# Patient Record
Sex: FEMALE | Race: WHITE | NOT HISPANIC OR LATINO
[De-identification: names, ages, dates, MRNs, and addresses within clinical notes are randomized per-mention and may not be internally consistent; named-entity substitution may affect disease eponyms.]

---

## 2021-08-10 PROBLEM — Z00.00 ENCOUNTER FOR PREVENTIVE HEALTH EXAMINATION: Status: ACTIVE | Noted: 2021-08-10

## 2021-08-13 ENCOUNTER — APPOINTMENT (OUTPATIENT)
Dept: ORTHOPEDIC SURGERY | Facility: CLINIC | Age: 51
End: 2021-08-13
Payer: COMMERCIAL

## 2021-08-13 ENCOUNTER — NON-APPOINTMENT (OUTPATIENT)
Age: 51
End: 2021-08-13

## 2021-08-13 VITALS
DIASTOLIC BLOOD PRESSURE: 85 MMHG | SYSTOLIC BLOOD PRESSURE: 125 MMHG | WEIGHT: 270 LBS | HEART RATE: 109 BPM | HEIGHT: 67.5 IN | BODY MASS INDEX: 41.88 KG/M2

## 2021-08-13 DIAGNOSIS — Z86.59 PERSONAL HISTORY OF OTHER MENTAL AND BEHAVIORAL DISORDERS: ICD-10-CM

## 2021-08-13 DIAGNOSIS — M18.12 UNILATERAL PRIMARY OSTEOARTHRITIS OF FIRST CARPOMETACARPAL JOINT, LEFT HAND: ICD-10-CM

## 2021-08-13 DIAGNOSIS — F17.200 NICOTINE DEPENDENCE, UNSPECIFIED, UNCOMPLICATED: ICD-10-CM

## 2021-08-13 DIAGNOSIS — Z78.0 ASYMPTOMATIC MENOPAUSAL STATE: ICD-10-CM

## 2021-08-13 PROCEDURE — 99204 OFFICE O/P NEW MOD 45 MIN: CPT

## 2021-08-13 PROCEDURE — 73130 X-RAY EXAM OF HAND: CPT | Mod: LT

## 2021-08-13 NOTE — HISTORY OF PRESENT ILLNESS
[FreeTextEntry1] : She comes in today for evaluation of left wrist and hand pain which began 4 years ago. She denies injury. She complains of intermittent numbness and tingling as well as pain which radiates up the left arm. Due to the pain, she finds that she guards her left upper extremity and does not use it for activities of daily living. She was previously seen by another doctor about 3 years ago who ordered an MRI at that time. She is not currently taking medication for pain. She rates her pain as a 7 out of 10. \par \par She was refereed by Dr. De Souza.

## 2021-08-13 NOTE — END OF VISIT
[FreeTextEntry3] : This note was written by Carito Ruelas on 08/13/2021 acting solely as a scribe for Dr. Jaspal Ta.\par  \par All medical record entries made by the Scribe were at my, Dr. Jaspal Ta, direction and personally dictated by me on 08/13/2021. I have personally reviewed the chart and agree that the record accurately reflects my personal performance of the history, physical exam, assessment and plan.

## 2021-08-13 NOTE — PHYSICAL EXAM
[de-identified] : - Constitutional: This is a female in no obvious distress.  \par - Psych: Patient is alert and oriented to person, place and time.  Patient has a normal mood and affect.\par - Cardiovascular: Normal pulses throughout the upper extremities.  No significant varicosities are noted in the upper extremities. \par - Neuro: Strength and sensation are intact throughout the upper extremities.  Patient has normal coordination.\par - Respiratory:  Patient exhibits no evidence of shortness of breath or difficulty breathing.\par - Skin: No rashes, lesions, or other abnormalities are noted in the upper extremities.\par \par --- \par \par Side: Left Thumb\par -  There is swelling and tenderness along the basal joint of the thumb.  \par -  There is a positive grind test.  \par -  There is no instability of the basal joint of the thumb.  \par -  There is no evidence of a trigger thumb.  \par -  There is no evidence of DeQuervain's tendonitis.  \par -There is no tenderness along the ulnocarpal joint or TFCC ligament.\par -  Provocative signs for carpal tunnel syndrome are negative.  \par -  There is normal strength and sensation distally along the radial, ulnar and median nerve distributions.  \par -  There is full composite range-of-motion of the other digits into the palm.  	 \par \par  [de-identified] : PA, lateral, and oblique radiographs of the left wrist and hand demonstrate advanced basal joint arthritis of the thumb.  There appears to be a small bone island in the distal radius.\par \par I reviewed her MRI from 3/2/18. 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 Arthrosis on the basilar joint of the thumb.

## 2021-08-13 NOTE — DISCUSSION/SUMMARY
[FreeTextEntry1] : She has findings consistent with chronic left thumb pain secondary to CMC joint arthritis. \par \par I had a discussion with the patient regarding today's visit, the prognosis of this diagnosis, and treatment recommendations and options. At this time, we discussed treatment options such as a cortisone injection or oral antiinflammatories and bracing. She was provided with a list of wrist braces. She deferred a cortisone injection at this time due to an adverse reaction from a previous cortisone injection into her foot in the past.  She told me that she is quite bothered by her symptoms and would like to schedule surgery sometime in the near future.  Again, she has deferred nonoperative management consisting of a cortisone injection and continued splinting and anti-inflammatories.  She will call the office when she is ready to schedule the procedure.\par \par -  The nature and purposes of the operation/procedure was discussed in detail.  I discussed the surgical procedure in detail, as well as expected postoperative recovery and outcome.\par -  Possible risks, benefits, and complications (from known and unknown causes) of the procedure were discussed in detail.  \par -  Possible non-operative alternatives to the proposed treatment were discussed in detail.  \par -  She was told that possible risks/complications include, but are not limited to:  Infection, sensory nerve or vessel injury, stiffness, painful scar, poor outcome, need for additional surgical procedures, and other unforeseen complications.  \par -  In addition, the possibility of an "unsuccessful outcome," despite "successful surgery," was discussed with the patient.  She also understands that she will be casted for 4 weeks postoperative and that basal joint arthroplasty has a prolonged recovery and it can sometimes take upwards of 6 months or longer for full recovery.\par -  The patient fully understands these risks and wishes to proceed.  \par -  I had a lengthy discussion with the patient regarding today's visit, the diagnosis, and my surgical treatment recommendations.  The patient has agreed to this plan of management and has expressed full understanding.  All questions were fully answered to the patient's satisfaction. 	\par \par My cumulative time spent on this visit included: Preparation for the visit, review of the medical records, review of pertinent diagnostic studies, examination and counseling of the patient on the above diagnosis, treatment plan and prognosis, orders of diagnostic tests, medication and/or appropriate procedures and documentation in the medical records of today's visit.

## 2021-08-13 NOTE — ADDENDUM
[FreeTextEntry1] : I, Carito Ruelas, acted solely as a scribe for Dr. Ta on this date on 08/13/2021.